# Patient Record
Sex: FEMALE | Race: WHITE
[De-identification: names, ages, dates, MRNs, and addresses within clinical notes are randomized per-mention and may not be internally consistent; named-entity substitution may affect disease eponyms.]

---

## 2017-12-04 ENCOUNTER — HOSPITAL ENCOUNTER (OUTPATIENT)
Dept: HOSPITAL 61 - KCIC MAMMO | Age: 42
Discharge: HOME | End: 2017-12-04
Attending: NURSE PRACTITIONER
Payer: COMMERCIAL

## 2017-12-04 DIAGNOSIS — Z12.31: Primary | ICD-10-CM

## 2017-12-04 DIAGNOSIS — R91.8: ICD-10-CM

## 2017-12-04 NOTE — [facility]
Bilateral digital screening mammograms:

 

Reason for examination: Routine baseline screening. 

 

Interpretation was made with the benefit of CAD.

 

The skin and nipples show no abnormalities. No abnormal axillary lymph 

nodes are seen. The breast parenchyma is heterogeneously dense. (Breast 

density: Category C.) There appears to be some focally nodular parenchyma 

suggested medially in the left breast on CC view possibly at the 7:00 B 

position. There is also some nodular architectural distortion suggested in

the lateral right breast on cc view possibly at the 9:00 B position 

centrally. Recommend further evaluation with additional coned compression 

views and ultrasound. There are no other dominant masses, suspicious 

calcifications or architectural distortion.

 

Impression:

 

Nodular areas of parenchymal density seen bilaterally. Recommend further 

evaluation with coned compression views and ultrasound.

 

Your patient's mammogram demonstrates that she has dense breast tissue 

(breast density category C or D), which could hide abnormalities, and if 

she has other risk factors for breast cancer that have been identified, 

she might benefit from supplemental screening tests that may be suggested 

by you as her ordering physician. Dense breast tissue, in and of itself, 

is a relatively common condition. Therefore, this information is not 

provided to cause undue concern, but rather to raise your awareness and to

promote discussion with your patient regarding the presence of other risk 

factors, in addition to dense breast tissue. Your patient's mammography 

results will be sent to her.

 

BI-RAD Category 0: Incomplete. Additional imaging is recommended.

 

"Our facility is accredited by the [employer] 

Mammography Program."

 

This patient's information has been entered into a reminder system for the

patient to be notified with the results of her examination and a target 

date for the next mammogram.

 

Electronically signed by: Dana Steiner MD (12/4/2017 5:35 PM) [place] [id number]

## 2017-12-14 ENCOUNTER — HOSPITAL ENCOUNTER (OUTPATIENT)
Dept: HOSPITAL 61 - KCIC MAMMO | Age: 42
Discharge: HOME | End: 2017-12-14
Attending: NURSE PRACTITIONER
Payer: COMMERCIAL

## 2017-12-14 DIAGNOSIS — N63.10: Primary | ICD-10-CM

## 2017-12-14 PROCEDURE — 76641 ULTRASOUND BREAST COMPLETE: CPT

## 2017-12-14 NOTE — [facility]
DATE: 12/14/2017   



EXAM: MAMMO PAULA JENNY HALLMANAT, BREAST BILATERAL



HISTORY: Suspicious screening study   



COMPARISON: 12/4/2017   



This study was interpreted with the benefit of Computerized Aided Detection

(CAD).



The breast parenchyma is heterogeneously dense, which could reduce sensitivity

of mammography. Breast parenchyma level C.



FINDINGS: Additional 3-D views of both breasts were obtained and correlated

with the screening study. No discrete mass is identified in the areas of

concern described medially in the left breast and laterally in the right

breast on the screening exam. The appearance on the previous 2-D images was

probably due to superimposed dense fibroglandular shadows.





Bilateral breast ultrasound, 12/14/2017:



The lateral aspects of both breasts were carefully scanned. Normal

heterogeneous fibroglandular shadows are present. No abnormality is seen on

the left.



A small hypoechoic nodule is seen at the 9:00 location on the right,

approximately 4 cm from the nipple. It measures 5 x 6 x 7 mm. Its anterior and

posterior walls are smooth and well-defined. There are low level internal

echoes. There is faint posterior acoustic enhancement. This is probably a

complicated cyst. No other abnormality was identified on the right.



IMPRESSION: 

1. Heterogeneously dense breasts without mammographic evidence of a discrete

mass.

2. The targeted ultrasound exam did demonstrate a small hypoechoic nodule in

the lateral right breast, most likely a complicated cyst. Sonographic

follow-up beginning in 4-6 months is suggested to confirm that this is a

benign finding.





BI-RADS CATEGORY: 3 PROBABLY BENIGN FINDING(S)-SHORT INTERVAL FOLLOW-UP

SUGGESTED



RECOMMENDED FOLLOW-UP: 6M 6 MONTH FOLLOW-UP



PQRS compliance statement: Patient information was entered into a reminder

system with a target due date     for the next mammogram.



Mammography is a sensitive method for finding small breast cancers, but it

does not detect them all and is not a substitute for careful clinical

examination.  A negative mammogram does not negate a clinically suspicious

finding and should not result in delay in biopsying a clinically suspicious

abnormality.



"Our facility is accredited by the [employer] Mammography

Program."

## 2020-06-08 ENCOUNTER — HOSPITAL ENCOUNTER (OUTPATIENT)
Dept: HOSPITAL 61 - KCIC MAMMO | Age: 45
Discharge: HOME | End: 2020-06-08
Payer: COMMERCIAL

## 2020-06-08 DIAGNOSIS — N64.89: ICD-10-CM

## 2020-06-08 DIAGNOSIS — Z12.31: Primary | ICD-10-CM

## 2020-06-08 PROCEDURE — 77063 BREAST TOMOSYNTHESIS BI: CPT

## 2020-06-08 PROCEDURE — 77067 SCR MAMMO BI INCL CAD: CPT

## 2020-06-08 NOTE — [facility]
Bilateral digital screening mammograms with 3-D tomosynthesis:

 

Reason for examination: Routine screening.

 

Comparison is made to previous studies dated 12/14/2017 and 12/4/2017.

 

Bilateral mammograms in CC and oblique projections were obtained with 2-D 

imaging and 3-D tomosynthesis imaging on a Siemens Inspiration unit and 

reviewed on the workstation. Interpretation was made with the benefit of 

CAD.

 

The skin and nipples show no abnormalities. No abnormal axillary lymph 

nodes are seen. The breast parenchyma is extremely dense. (Breast density:

Category D.) There continues to be some dense asymmetry in the 

retroareolar position of the left breast which is stable. There are no new

dominant masses, suspicious calcifications or architectural distortion.

 

Impression:

 

No evidence of malignancy. Recommend routine screening.

 

Your patient's mammogram demonstrates that she has dense breast tissue 

(breast density category C or D), which could hide abnormalities, and if 

she has other risk factors for breast cancer that have been identified, 

she might benefit from supplemental screening tests that may be suggested 

by you as her ordering physician. Dense breast tissue, in and of itself, 

is a relatively common condition. Therefore, this information is not 

provided to cause undue concern, but rather to raise your awareness and to

promote discussion with your patient regarding the presence of other risk 

factors, in addition to dense breast tissue. Your patient's mammography 

results will be sent to her.

 

BI-RAD Category 2: Benign.

 

"Our facility is accredited by the [employer] 

Mammography Program."

 

This patient's information has been entered into a reminder system for the

patient to be notified with the results of her examination and a target 

date for the next mammogram.

 

Electronically signed by: Dana Steiner MD (6/8/2020 6:10 PM) [email or web address]